# Patient Record
Sex: MALE | Race: WHITE | NOT HISPANIC OR LATINO
[De-identification: names, ages, dates, MRNs, and addresses within clinical notes are randomized per-mention and may not be internally consistent; named-entity substitution may affect disease eponyms.]

---

## 2017-04-06 ENCOUNTER — APPOINTMENT (OUTPATIENT)
Dept: CARDIOLOGY | Facility: CLINIC | Age: 77
End: 2017-04-06

## 2017-07-07 ENCOUNTER — APPOINTMENT (OUTPATIENT)
Dept: CARDIOLOGY | Facility: CLINIC | Age: 77
End: 2017-07-07

## 2017-12-19 ENCOUNTER — RECORD ABSTRACTING (OUTPATIENT)
Age: 77
End: 2017-12-19

## 2017-12-19 DIAGNOSIS — Z86.79 PERSONAL HISTORY OF OTHER DISEASES OF THE CIRCULATORY SYSTEM: ICD-10-CM

## 2017-12-19 DIAGNOSIS — Z87.19 PERSONAL HISTORY OF OTHER DISEASES OF THE DIGESTIVE SYSTEM: ICD-10-CM

## 2017-12-19 DIAGNOSIS — Z87.39 PERSONAL HISTORY OF OTHER DISEASES OF THE MUSCULOSKELETAL SYSTEM AND CONNECTIVE TISSUE: ICD-10-CM

## 2017-12-19 DIAGNOSIS — Z86.39 PERSONAL HISTORY OF OTHER ENDOCRINE, NUTRITIONAL AND METABOLIC DISEASE: ICD-10-CM

## 2017-12-19 DIAGNOSIS — Z86.73 PERSONAL HISTORY OF TRANSIENT ISCHEMIC ATTACK (TIA), AND CEREBRAL INFARCTION W/OUT RESIDUAL DEFICITS: ICD-10-CM

## 2017-12-19 DIAGNOSIS — Z87.898 PERSONAL HISTORY OF OTHER SPECIFIED CONDITIONS: ICD-10-CM

## 2017-12-19 DIAGNOSIS — Z80.0 FAMILY HISTORY OF MALIGNANT NEOPLASM OF DIGESTIVE ORGANS: ICD-10-CM

## 2017-12-19 DIAGNOSIS — Z78.9 OTHER SPECIFIED HEALTH STATUS: ICD-10-CM

## 2017-12-19 DIAGNOSIS — Z98.890 OTHER SPECIFIED POSTPROCEDURAL STATES: ICD-10-CM

## 2017-12-19 DIAGNOSIS — Z86.69 PERSONAL HISTORY OF OTHER DISEASES OF THE NERVOUS SYSTEM AND SENSE ORGANS: ICD-10-CM

## 2017-12-20 ENCOUNTER — RX RENEWAL (OUTPATIENT)
Age: 77
End: 2017-12-20

## 2018-03-09 ENCOUNTER — MEDICATION RENEWAL (OUTPATIENT)
Age: 78
End: 2018-03-09

## 2018-03-09 ENCOUNTER — APPOINTMENT (OUTPATIENT)
Dept: CARDIOLOGY | Facility: CLINIC | Age: 78
End: 2018-03-09
Payer: MEDICARE

## 2018-03-09 ENCOUNTER — APPOINTMENT (OUTPATIENT)
Dept: CARDIOLOGY | Facility: CLINIC | Age: 78
End: 2018-03-09

## 2018-03-09 VITALS
DIASTOLIC BLOOD PRESSURE: 72 MMHG | HEART RATE: 72 BPM | HEIGHT: 71 IN | WEIGHT: 210 LBS | SYSTOLIC BLOOD PRESSURE: 118 MMHG | BODY MASS INDEX: 29.4 KG/M2

## 2018-03-09 PROCEDURE — 99214 OFFICE O/P EST MOD 30 MIN: CPT

## 2018-03-12 ENCOUNTER — RX RENEWAL (OUTPATIENT)
Age: 78
End: 2018-03-12

## 2018-03-27 ENCOUNTER — RX RENEWAL (OUTPATIENT)
Age: 78
End: 2018-03-27

## 2018-06-07 ENCOUNTER — MEDICATION RENEWAL (OUTPATIENT)
Age: 78
End: 2018-06-07

## 2018-06-08 ENCOUNTER — RX RENEWAL (OUTPATIENT)
Age: 78
End: 2018-06-08

## 2018-12-19 ENCOUNTER — RX RENEWAL (OUTPATIENT)
Age: 78
End: 2018-12-19

## 2018-12-26 ENCOUNTER — RX RENEWAL (OUTPATIENT)
Age: 78
End: 2018-12-26

## 2019-04-09 ENCOUNTER — MEDICATION RENEWAL (OUTPATIENT)
Age: 79
End: 2019-04-09

## 2019-07-09 ENCOUNTER — NON-APPOINTMENT (OUTPATIENT)
Age: 79
End: 2019-07-09

## 2019-07-09 ENCOUNTER — APPOINTMENT (OUTPATIENT)
Dept: CARDIOLOGY | Facility: CLINIC | Age: 79
End: 2019-07-09
Payer: MEDICARE

## 2019-07-09 VITALS
BODY MASS INDEX: 29.68 KG/M2 | WEIGHT: 212 LBS | DIASTOLIC BLOOD PRESSURE: 56 MMHG | HEART RATE: 56 BPM | SYSTOLIC BLOOD PRESSURE: 104 MMHG | HEIGHT: 71 IN

## 2019-07-09 PROCEDURE — 93000 ELECTROCARDIOGRAM COMPLETE: CPT

## 2019-07-09 PROCEDURE — 99214 OFFICE O/P EST MOD 30 MIN: CPT | Mod: 25

## 2019-07-09 RX ORDER — FINASTERIDE 5 MG/1
5 TABLET, FILM COATED ORAL DAILY
Refills: 0 | Status: DISCONTINUED | COMMUNITY
End: 2019-07-09

## 2019-07-09 RX ORDER — METOPROLOL TARTRATE 25 MG/1
25 TABLET, FILM COATED ORAL
Qty: 180 | Refills: 3 | Status: DISCONTINUED | COMMUNITY
Start: 2018-06-08 | End: 2019-07-09

## 2019-07-09 RX ORDER — HYDROCHLOROTHIAZIDE 12.5 MG/1
12.5 CAPSULE ORAL DAILY
Refills: 0 | Status: DISCONTINUED | COMMUNITY
End: 2019-07-09

## 2019-07-09 RX ORDER — UBIDECARENONE 200 MG
200 CAPSULE ORAL DAILY
Refills: 0 | Status: DISCONTINUED | COMMUNITY
End: 2019-07-09

## 2019-07-09 NOTE — ASSESSMENT
[FreeTextEntry1] : CAD: The patient has good exercise ability without exertional symptoms.\par \par Hypertension: Well controlled.\par \par Mitral regurgitation: Asymptomatic, however it has been 5 years since last echocardiogram. Will order to monitor for progression of disease.\par \par Follow up on 1 year or sooner if needed.

## 2019-07-09 NOTE — HISTORY OF PRESENT ILLNESS
[FreeTextEntry1] : CAD: The patient has good exercise ability without exertional symptoms.\par \par Hypertension: Well controlled.\par \par Mitral regurgitation: Asymptomatic

## 2019-07-09 NOTE — REVIEW OF SYSTEMS
[Shortness Of Breath] : no shortness of breath [Dyspnea on exertion] : not dyspnea during exertion [Chest Pain] : no chest pain [Lower Ext Edema] : no extremity edema [Palpitations] : no palpitations [Joint Pain] : joint pain [Joint Stiffness] : joint stiffness [Negative] : Heme/Lymph

## 2019-07-09 NOTE — PHYSICAL EXAM
[General Appearance - Well Developed] : well developed [Normal Appearance] : normal appearance [Well Groomed] : well groomed [General Appearance - Well Nourished] : well nourished [No Deformities] : no deformities [General Appearance - In No Acute Distress] : no acute distress [Normal Conjunctiva] : the conjunctiva exhibited no abnormalities [Eyelids - No Xanthelasma] : the eyelids demonstrated no xanthelasmas [Normal Oral Mucosa] : normal oral mucosa [No Oral Pallor] : no oral pallor [No Oral Cyanosis] : no oral cyanosis [Normal Jugular Venous A Waves Present] : normal jugular venous A waves present [Normal Jugular Venous V Waves Present] : normal jugular venous V waves present [No Jugular Venous Lang A Waves] : no jugular venous lang A waves [Respiration, Rhythm And Depth] : normal respiratory rhythm and effort [Exaggerated Use Of Accessory Muscles For Inspiration] : no accessory muscle use [Auscultation Breath Sounds / Voice Sounds] : lungs were clear to auscultation bilaterally [Heart Rate And Rhythm] : heart rate and rhythm were normal [Heart Sounds] : normal S1 and S2 [Abnormal Walk] : normal gait [Murmurs] : no murmurs present [Nail Clubbing] : no clubbing of the fingernails [Cyanosis, Localized] : no localized cyanosis [Skin Turgor] : normal skin turgor [Impaired Insight] : insight and judgment were intact [] : no rash [Affect] : the affect was normal [Memory Recent] : recent memory was not impaired

## 2019-07-12 ENCOUNTER — APPOINTMENT (OUTPATIENT)
Dept: CARDIOLOGY | Facility: CLINIC | Age: 79
End: 2019-07-12
Payer: MEDICARE

## 2019-07-12 PROCEDURE — 93306 TTE W/DOPPLER COMPLETE: CPT

## 2019-07-15 ENCOUNTER — APPOINTMENT (OUTPATIENT)
Dept: CARDIOLOGY | Facility: CLINIC | Age: 79
End: 2019-07-15

## 2019-10-09 ENCOUNTER — MEDICATION RENEWAL (OUTPATIENT)
Age: 79
End: 2019-10-09

## 2020-03-26 ENCOUNTER — RX RENEWAL (OUTPATIENT)
Age: 80
End: 2020-03-26

## 2020-06-14 ENCOUNTER — RX RENEWAL (OUTPATIENT)
Age: 80
End: 2020-06-14

## 2021-03-23 ENCOUNTER — RX RENEWAL (OUTPATIENT)
Age: 81
End: 2021-03-23

## 2021-11-16 ENCOUNTER — RX RENEWAL (OUTPATIENT)
Age: 81
End: 2021-11-16

## 2021-12-07 ENCOUNTER — NON-APPOINTMENT (OUTPATIENT)
Age: 81
End: 2021-12-07

## 2021-12-08 ENCOUNTER — APPOINTMENT (OUTPATIENT)
Dept: CARDIOLOGY | Facility: CLINIC | Age: 81
End: 2021-12-08
Payer: MEDICARE

## 2021-12-08 ENCOUNTER — NON-APPOINTMENT (OUTPATIENT)
Age: 81
End: 2021-12-08

## 2021-12-08 VITALS
WEIGHT: 212 LBS | DIASTOLIC BLOOD PRESSURE: 58 MMHG | HEART RATE: 57 BPM | OXYGEN SATURATION: 98 % | TEMPERATURE: 96.9 F | HEIGHT: 71 IN | BODY MASS INDEX: 29.68 KG/M2 | SYSTOLIC BLOOD PRESSURE: 90 MMHG

## 2021-12-08 PROCEDURE — 99214 OFFICE O/P EST MOD 30 MIN: CPT

## 2021-12-08 PROCEDURE — 93000 ELECTROCARDIOGRAM COMPLETE: CPT

## 2021-12-08 RX ORDER — METOPROLOL TARTRATE 25 MG/1
25 TABLET, FILM COATED ORAL
Qty: 60 | Refills: 0 | Status: DISCONTINUED | COMMUNITY
Start: 2020-06-14 | End: 2021-12-08

## 2021-12-08 RX ORDER — METOPROLOL TARTRATE 25 MG/1
25 TABLET, FILM COATED ORAL DAILY
Refills: 0 | Status: DISCONTINUED | COMMUNITY
Start: 1900-01-01 | End: 2021-12-08

## 2021-12-09 ENCOUNTER — NON-APPOINTMENT (OUTPATIENT)
Age: 81
End: 2021-12-09

## 2021-12-10 ENCOUNTER — APPOINTMENT (OUTPATIENT)
Dept: CARDIOLOGY | Facility: CLINIC | Age: 81
End: 2021-12-10
Payer: MEDICARE

## 2021-12-10 PROCEDURE — 78452 HT MUSCLE IMAGE SPECT MULT: CPT

## 2021-12-10 PROCEDURE — A9502: CPT

## 2021-12-10 PROCEDURE — 93015 CV STRESS TEST SUPVJ I&R: CPT

## 2021-12-10 NOTE — HISTORY OF PRESENT ILLNESS
[FreeTextEntry1] : \par 82 yo M with CAD s/p PCI RCA/LAD 2010, HTN, HLD, former smoker, obesity here for cv follow up. Last visit was in 2019. requires refills.\par \par since that visit, patient has been feeling well. weight stable. mild dyspnea. \par \par Initial EKG today with possible atrial fibrillation; repeat with likely junctional bradycardia. \par \par \par TESTING:\par \par 2019 TTE preserved function. mild LAE\par LE art u/s: mild disease\par labs normal cbc/lft. cr 1.17. ldl 66 \par \par 2016 stress echo no ischemia but difficult study and 3 minutes exercise\par \par 2015 no AAA\par \par Cath 2014 patent stents. mid LAD 30%.\par \par Cath 2010 PCI RCA 3.0x28 [employer] and mid LAD 3.0x15 BMS

## 2021-12-10 NOTE — DISCUSSION/SUMMARY
[FreeTextEntry1] : \par 82 yo M with CAD s/p PCI RCA/LAD 2010, HTN, HLD, former smoker, obesity here for cv follow up. Last visit was in 2019. requires refills. since that visit, patient has been feeling well. weight stable. mild dyspnea. \par \par Initial EKG today with possible atrial fibrillation; repeat with likely junctional bradycardia. \par \par Recommend: \par aspirin, high potency statin, stop any AVN blocker\par I ordered a:\par 3 day zio patch\par comprehensive labwork\par updated TTE\par pharmacologic NST\par carotid duplex\par home sleep study\par \par \par Follow up after testing. Patient declines testing. Will call back if any changes. ER precautions given to patient.

## 2021-12-10 NOTE — PHYSICAL EXAM
[General Appearance - Well Developed] : well developed [Normal Appearance] : normal appearance [Well Groomed] : well groomed [General Appearance - Well Nourished] : well nourished [No Deformities] : no deformities [General Appearance - In No Acute Distress] : no acute distress [Normal Conjunctiva] : the conjunctiva exhibited no abnormalities [Eyelids - No Xanthelasma] : the eyelids demonstrated no xanthelasmas [Normal Oral Mucosa] : normal oral mucosa [No Oral Pallor] : no oral pallor [No Oral Cyanosis] : no oral cyanosis [Normal Jugular Venous V Waves Present] : normal jugular venous V waves present [No Jugular Venous Lang A Waves] : no jugular venous lang A waves [Respiration, Rhythm And Depth] : normal respiratory rhythm and effort [Nail Clubbing] : no clubbing of the fingernails [Cyanosis, Localized] : no localized cyanosis [Skin Turgor] : normal skin turgor [] : no rash [Impaired Insight] : insight and judgment were intact [Affect] : the affect was normal [Memory Recent] : recent memory was not impaired [FreeTextEntry1] : cane

## 2021-12-13 DIAGNOSIS — Z87.898 PERSONAL HISTORY OF OTHER SPECIFIED CONDITIONS: ICD-10-CM

## 2021-12-13 DIAGNOSIS — I34.0 NONRHEUMATIC MITRAL (VALVE) INSUFFICIENCY: ICD-10-CM

## 2021-12-14 ENCOUNTER — NON-APPOINTMENT (OUTPATIENT)
Age: 81
End: 2021-12-14

## 2021-12-15 ENCOUNTER — NON-APPOINTMENT (OUTPATIENT)
Age: 81
End: 2021-12-15

## 2021-12-16 ENCOUNTER — APPOINTMENT (OUTPATIENT)
Dept: CARDIOLOGY | Facility: CLINIC | Age: 81
End: 2021-12-16
Payer: MEDICARE

## 2021-12-16 PROCEDURE — 93242 EXT ECG>48HR<7D RECORDING: CPT

## 2021-12-16 PROCEDURE — 93306 TTE W/DOPPLER COMPLETE: CPT

## 2021-12-16 PROCEDURE — 93880 EXTRACRANIAL BILAT STUDY: CPT

## 2021-12-17 ENCOUNTER — NON-APPOINTMENT (OUTPATIENT)
Age: 81
End: 2021-12-17

## 2021-12-20 ENCOUNTER — APPOINTMENT (OUTPATIENT)
Dept: CARDIOLOGY | Facility: CLINIC | Age: 81
End: 2021-12-20
Payer: MEDICARE

## 2021-12-20 VITALS
HEIGHT: 71 IN | SYSTOLIC BLOOD PRESSURE: 112 MMHG | WEIGHT: 209 LBS | TEMPERATURE: 97.1 F | DIASTOLIC BLOOD PRESSURE: 54 MMHG | OXYGEN SATURATION: 95 % | HEART RATE: 61 BPM | BODY MASS INDEX: 29.26 KG/M2

## 2021-12-20 PROCEDURE — 99215 OFFICE O/P EST HI 40 MIN: CPT

## 2021-12-20 RX ORDER — ASPIRIN ENTERIC COATED TABLETS 81 MG 81 MG/1
81 TABLET, DELAYED RELEASE ORAL DAILY
Refills: 0 | Status: DISCONTINUED | COMMUNITY
End: 2021-12-20

## 2021-12-20 NOTE — DISCUSSION/SUMMARY
[FreeTextEntry1] : \par 82 yo M with CAD s/p PCI RCA/LAD 2010, newly diagnosed paroxysmal atrial fibrillation (CHADSVASC 5), HTN, HLD, NIDDM2, PAD (severe left ICA stenosis), former smoker, obesity, mod AI here for cv follow up.\par \par \par - zio patch results and call patient. off AVN for now. If persistent AF, will call patient to discuss potential ASHLI/DCCV in 4 weeks.\par - switch aspirin to eliquis 5 mg BID after lengthy discussion\par - increase atorvastatin to 80 (goal LDL<70)\par - Check carotid duplex in 6 months. If velocities, especially diastolic increase, consider intervention. \par - yearly echo for AI\par - he will call back if he is amenable to home sleep study\par - newly diagnosed DM2 treatment per PCP, consideration of metformin\par \par \par Forward to PCP, Dr. Lee. Follow up in 6 months with same day carotid duplex. ER precautions given to patient.

## 2021-12-24 ENCOUNTER — NON-APPOINTMENT (OUTPATIENT)
Age: 81
End: 2021-12-24

## 2021-12-27 ENCOUNTER — NON-APPOINTMENT (OUTPATIENT)
Age: 81
End: 2021-12-27

## 2021-12-27 ENCOUNTER — RX RENEWAL (OUTPATIENT)
Age: 81
End: 2021-12-27

## 2021-12-27 ENCOUNTER — APPOINTMENT (OUTPATIENT)
Dept: CARDIOLOGY | Facility: CLINIC | Age: 81
End: 2021-12-27
Payer: MEDICARE

## 2021-12-27 VITALS
BODY MASS INDEX: 28.7 KG/M2 | HEIGHT: 71 IN | TEMPERATURE: 97.3 F | WEIGHT: 205 LBS | DIASTOLIC BLOOD PRESSURE: 70 MMHG | HEART RATE: 63 BPM | SYSTOLIC BLOOD PRESSURE: 138 MMHG | OXYGEN SATURATION: 94 %

## 2021-12-27 PROCEDURE — 99215 OFFICE O/P EST HI 40 MIN: CPT

## 2021-12-27 PROCEDURE — 93244 EXT ECG>48HR<7D REV&INTERPJ: CPT

## 2021-12-27 RX ORDER — ASPIRIN 81 MG
81 TABLET, DELAYED RELEASE (ENTERIC COATED) ORAL
Refills: 0 | Status: DISCONTINUED | COMMUNITY
End: 2021-12-27

## 2021-12-27 RX ORDER — ALLOPURINOL 100 MG/1
100 TABLET ORAL DAILY
Refills: 0 | Status: ACTIVE | COMMUNITY

## 2021-12-27 RX ORDER — APIXABAN 5 MG/1
5 TABLET, FILM COATED ORAL
Qty: 90 | Refills: 3 | Status: DISCONTINUED | COMMUNITY
Start: 2021-12-20 | End: 2021-12-27

## 2021-12-27 NOTE — DISCUSSION/SUMMARY
[FreeTextEntry1] : \par 82 yo M with CAD s/p PCI RCA/LAD 2010, newly diagnosed paroxysmal atrial fibrillation (CHADSVASC 5), HTN, HLD, NIDDM2, PAD (severe left ICA stenosis), former smoker, obesity, mod AI here for add-on appointment due to abnormal Zio patch\par \par 3-day Zio patch: Read as sinus and SVT however potentially appears to be atrial fibrillation.  5.3-second pause reported to be in sinus rhythm however I cannot rule out that this was in atrial fibrillation. This patch was ON the metoprolol.\par \par \par - will expedite review by EP (patch was on BB). off AVN for now.\par - switched aspirin to eliquis 5 mg BID after lengthy discussion (but due to cost, after these 45 days will switch; seeing cost of xarelto and pradaxa).\par - increased atorvastatin to 80 (goal LDL<70)\par - Check carotid duplex in 6 months next 6/2022. If velocities, especially diastolic increase, consider intervention. \par - yearly echo for AI next 12/2022\par - he will call back if he is amenable to home sleep study\par - newly diagnosed DM2 treatment per PCP, consideration of metformin\par \par \par Call him with plan from EP standpoint tomorrow. Follow up with me in 6 months with same day carotid duplex (call and adapt plan prior). ER precautions given to patient.

## 2021-12-27 NOTE — HISTORY OF PRESENT ILLNESS
[FreeTextEntry1] : \par 80 yo M with CAD s/p PCI RCA/LAD 2010, newly diagnosed paroxysmal atrial fibrillation (CHADSVASC 5), HTN, HLD, NIDDM2, PAD (severe left ICA stenosis), former smoker, obesity, mod AI here for add-on appointment due to abnormal Zio patch.\par \par He declines sleep study. no active cv sx. 3-day Zio patch: Read as sinus and SVT however potentially appears to be atrial fibrillation.  5.3-second pause reported to be in sinus rhythm however I cannot rule out that this was in atrial fibrillation. This patch was ON the metoprolol.\par \par \par \par TESTING:\par 12/2021:\par 3-day Zio patch: Read as sinus and SVT however potentially appears to be atrial fibrillation.  5.3-second pause reported to be in sinus rhythm however I cannot rule out that this was in atrial fibrillation. This patch was ON the metoprolol.\par Nuclear stress test: No ischemia.  Preserved function.  Atrial fibrillation noted on EKG tracings.\par TTE: Mild mitral valve disease.  Moderate aortic regurgitation.  Mildly dilated left atrium.  Preserved biventricular function. G1DD.  Normal PASP.\par Carotid duplex: Left 70-99%. mild JOSUE\par Labwork: Grossly normal CBC, CMP.  Creatinine 1.21.  Normal TFT.  LDL 78.  A1c 7.0.  \par \par 2019 TTE preserved function. mild LAE\par LE art u/s: mild disease\par labs normal cbc/lft. cr 1.17. ldl 66 \par \par 2016 stress echo no ischemia but difficult study and 3 minutes exercise\par \par 2015 no AAA\par \par Cath 2014 patent stents. mid LAD 30%.\par \par Cath 2010 PCI RCA 3.0x28 [employer] and mid LAD 3.0x15 BMS

## 2021-12-30 ENCOUNTER — NON-APPOINTMENT (OUTPATIENT)
Age: 81
End: 2021-12-30

## 2022-01-03 ENCOUNTER — APPOINTMENT (OUTPATIENT)
Dept: ELECTROPHYSIOLOGY | Facility: CLINIC | Age: 82
End: 2022-01-03
Payer: MEDICARE

## 2022-01-03 VITALS
DIASTOLIC BLOOD PRESSURE: 60 MMHG | WEIGHT: 205 LBS | HEIGHT: 71 IN | BODY MASS INDEX: 28.7 KG/M2 | HEART RATE: 84 BPM | OXYGEN SATURATION: 98 % | SYSTOLIC BLOOD PRESSURE: 134 MMHG | TEMPERATURE: 97.1 F

## 2022-01-03 PROCEDURE — 93000 ELECTROCARDIOGRAM COMPLETE: CPT

## 2022-01-03 PROCEDURE — 99204 OFFICE O/P NEW MOD 45 MIN: CPT

## 2022-01-23 ENCOUNTER — NON-APPOINTMENT (OUTPATIENT)
Age: 82
End: 2022-01-23

## 2022-01-23 NOTE — PHYSICAL EXAM
[No Acute Distress] : no acute distress [Normal Conjunctiva] : normal conjunctiva [Normal Venous Pressure] : normal venous pressure [Normal S1, S2] : normal S1, S2 [Clear Lung Fields] : clear lung fields [Non Tender] : non-tender [No Edema] : no edema [No Cyanosis] : no cyanosis [No Rash] : no rash [Moves all extremities] : moves all extremities [Alert and Oriented] : alert and oriented

## 2022-01-23 NOTE — DISCUSSION/SUMMARY
[FreeTextEntry1] : This is an 81-year-old man who has had prior paroxysmal atrial fibrillation and is anticoagulated with Xarelto.  He had a 5.3-second pause while sleeping.  Patient was on beta-blocker therapy as well.  He denies any symptoms of dizziness, lightheadedness, syncope or presyncope.  Beta-blocker has been discontinued and follow-up monitoring recommended to see if he has recurrent pauses.  Patient was also instructed regarding symptoms related to bradycardia and the need for emergent treatment if symptoms should develop.  He does not want pacing this time.  We have discussed the option also of implantable loop monitor.  We will start a monitor currently.  We will maintain off beta-blockers for now.  Anticoagulation.

## 2022-01-23 NOTE — HISTORY OF PRESENT ILLNESS
[FreeTextEntry1] : Patient was seen for a 5-second pause noted on Zio patch monitor.  He was previously on a beta-blocker which was discontinued.\мария childress Is an 81-year-old man with a prior history of PCI to the RCA/LAD 2010.  Prior history of hypertension, diabetes, dyslipidemia, obesity vascular disease with severe left internal carotid artery stenosis.  Former smoker.\par \par He denies any symptoms of dizziness, lightheadedness, chest pain, shortness of breath, syncope or presyncope.\par \par Zio patch monitor from 12/16/2021: 3 days monitor showed 5-second pause  sinus node with junctional escape 12/16/2021 at 7:35 PM.  He has had regular rhythm suggestive of atrial fibrillation/flutter.\par \par Echocardiogram from 12/16/2021 showed EF 60%, LA 5.6 cm mild mitral regurgitation, left atrial volume index 38 cc/m², normal pulmonary pressures.\par Nuclear stress test 12/10/2021 normal myocardial perfusion scan.\мария childress Is currently on amlodipine as well as Xarelto.

## 2022-01-23 NOTE — REVIEW OF SYSTEMS
[Fever] : no fever [Feeling Fatigued] : not feeling fatigued [Blurry Vision] : no blurred vision [Sore Throat] : no sore throat [SOB] : no shortness of breath [Dyspnea on exertion] : not dyspnea during exertion [Palpitations] : no palpitations [Cough] : no cough [Abdominal Pain] : no abdominal pain [Dizziness] : no dizziness [Confusion] : no confusion was observed [Anxiety] : no anxiety [Under Stress] : not under stress [Easy Bleeding] : no tendency for easy bleeding

## 2022-04-04 ENCOUNTER — APPOINTMENT (OUTPATIENT)
Dept: CARDIOLOGY | Facility: CLINIC | Age: 82
End: 2022-04-04
Payer: MEDICARE

## 2022-04-04 ENCOUNTER — NON-APPOINTMENT (OUTPATIENT)
Age: 82
End: 2022-04-04

## 2022-04-04 VITALS
WEIGHT: 200 LBS | BODY MASS INDEX: 28 KG/M2 | TEMPERATURE: 97.1 F | DIASTOLIC BLOOD PRESSURE: 70 MMHG | HEART RATE: 99 BPM | OXYGEN SATURATION: 98 % | HEIGHT: 71 IN | SYSTOLIC BLOOD PRESSURE: 116 MMHG

## 2022-04-04 PROCEDURE — 99215 OFFICE O/P EST HI 40 MIN: CPT

## 2022-04-04 PROCEDURE — 93880 EXTRACRANIAL BILAT STUDY: CPT

## 2022-04-04 RX ORDER — ATORVASTATIN CALCIUM 80 MG/1
80 TABLET, FILM COATED ORAL DAILY
Qty: 90 | Refills: 3 | Status: DISCONTINUED | COMMUNITY
Start: 2018-12-26 | End: 2022-04-04

## 2022-04-04 NOTE — DISCUSSION/SUMMARY
[FreeTextEntry1] : \par 81 yo M with CAD s/p PCI RCA/LAD 2010, paroxysmal atrial fibrillation (CHADSVASC 5), HTN, HLD, NIDDM2, PAD (severe left ICA stenosis), former smoker, obesity, mod AI.\par \par \par - ordered 3 day zio patch now off beta blocker. following with EP.\par - continue on xarelto 20. high risk med use. no side effects noted. serial labwork. (was on eliquis but due to cost switched). \par - continue atorva 80 and ordered serial labwork (goal LDL<70). \par - Check carotid duplex in 6 months next 10/2022. If diastolic velocity increase or symptoms, consider intervention. high risk disease process.\par - yearly echo for AI ordered\par - he will call back if he is amenable to home sleep study\par - newly diagnosed DM2 treatment per PCP, consideration of metformin\par \par ER precautions given to patient.

## 2022-04-04 NOTE — HISTORY OF PRESENT ILLNESS
[FreeTextEntry1] : \par 83 yo M with CAD s/p PCI RCA/LAD 2010, paroxysmal atrial fibrillation (CHADSVASC 5), HTN, HLD, NIDDM2, PAD (severe left ICA stenosis), former smoker, obesity, mod AI.\par \par VISIT 4/2022: interim saw EP for 5.3 sec pause while sleeping on BB. d/c'ed BB and follow up monitor recommended. no symptoms. was offered ILR but start with a monitor off BB. carotid duplex today with  and peak EDV 63. prior was EDV 70. no amaurosis fugax or cva sx. no alarm s/s.\par \par \par TESTING:\par 4/2022:\par CAROTID DUPLEX: L ICA 70-99% (peak DV 63, ). \par \par 12/2021:\par 3-day Zio patch: Read as sinus and SVT however potentially appears to be atrial fibrillation.  5.3-second pause reported to be in sinus rhythm however I cannot rule out that this was in atrial fibrillation. This patch was ON the metoprolol.\par Nuclear stress test: No ischemia.  Preserved function.  Atrial fibrillation noted on EKG tracings.\par TTE: Mild mitral valve disease.  Moderate aortic regurgitation.  Mildly dilated left atrium.  Preserved biventricular function. G1DD.  Normal PASP.\par Carotid duplex: Left 70-99%. mild JOSUE. L peak  and EDV 70.\par Labwork: Grossly normal CBC, CMP.  Creatinine 1.21.  Normal TFT.  LDL 78.  A1c 7.0.  \par \par 2019 TTE preserved function. mild LAE\par LE art u/s: mild disease\par labs normal cbc/lft. cr 1.17. ldl 66 \par \par 2016 stress echo no ischemia but difficult study and 3 minutes exercise\par \par 2015 no AAA\par \par Cath 2014 patent stents. mid LAD 30%.\par \par Cath 2010 PCI RCA 3.0x28 [employer] and mid LAD 3.0x15 BMS

## 2022-04-13 ENCOUNTER — NON-APPOINTMENT (OUTPATIENT)
Age: 82
End: 2022-04-13

## 2022-04-14 ENCOUNTER — NON-APPOINTMENT (OUTPATIENT)
Age: 82
End: 2022-04-14

## 2022-10-03 ENCOUNTER — APPOINTMENT (OUTPATIENT)
Dept: CARDIOLOGY | Facility: CLINIC | Age: 82
End: 2022-10-03

## 2022-10-03 VITALS
OXYGEN SATURATION: 98 % | HEIGHT: 71 IN | TEMPERATURE: 96.9 F | DIASTOLIC BLOOD PRESSURE: 56 MMHG | SYSTOLIC BLOOD PRESSURE: 112 MMHG | WEIGHT: 176 LBS | BODY MASS INDEX: 24.64 KG/M2 | HEART RATE: 58 BPM

## 2022-10-03 PROCEDURE — 93306 TTE W/DOPPLER COMPLETE: CPT

## 2022-10-03 PROCEDURE — 93880 EXTRACRANIAL BILAT STUDY: CPT

## 2022-10-03 PROCEDURE — 99215 OFFICE O/P EST HI 40 MIN: CPT

## 2022-10-03 RX ORDER — RIVAROXABAN 20 MG/1
20 TABLET, FILM COATED ORAL
Qty: 90 | Refills: 3 | Status: DISCONTINUED | COMMUNITY
Start: 2021-12-27 | End: 2022-10-03

## 2022-10-03 NOTE — HISTORY OF PRESENT ILLNESS
[FreeTextEntry1] : \par 83 yo M with CAD s/p PCI RCA/LAD 2010, paroxysmal atrial fibrillation (CHADSVASC 5), HTN, HLD, NIDDM2, PAD (severe left ICA stenosis), former smoker, obesity, mod AI.\par \par 10/2022: interim testing done. did well off BB. psv 307 edv 50 stable. \par \par VISIT 4/2022: interim saw EP for 5.3 sec pause while sleeping on BB. d/c'ed BB and follow up monitor recommended. no symptoms. was offered ILR but start with a monitor off BB. carotid duplex today with  and peak EDV 63. prior was EDV 70. no amaurosis fugax or cva sx. no alarm s/s.\par \par \par TESTING:\par 10/2022:\par TTE: asc ao 4.2. preserved bivent function. mod AI. \par CAROTID DUPLEX. L ICA severe, peak DV 50, .\par \par 4/2022:\par CAROTID DUPLEX: L ICA 70-99% (peak DV 63, ). \par LABWORK: hgb 13. cr 1. LDL 75. a1c 7.2. normal tft. \par 3 DAY ZIO PATCH OFF BB: brief AT, 5% PACs. no sig abnormality otherwise. \par \par 12/2021:\par 3-day Zio patch: Read as sinus and SVT however potentially appears to be atrial fibrillation.  5.3-second pause reported to be in sinus rhythm however I cannot rule out that this was in atrial fibrillation. This patch was ON the metoprolol.\par Nuclear stress test: No ischemia.  Preserved function.  Atrial fibrillation noted on EKG tracings.\par TTE: Mild mitral valve disease.  Moderate aortic regurgitation.  Mildly dilated left atrium.  Preserved biventricular function. G1DD.  Normal PASP.\par Carotid duplex: Left 70-99%. mild JOSUE. L peak  and EDV 70.\par Labwork: Grossly normal CBC, CMP.  Creatinine 1.21.  Normal TFT.  LDL 78.  A1c 7.0.  \par \par 2019 TTE preserved function. mild LAE\par LE art u/s: mild disease\par labs normal cbc/lft. cr 1.17. ldl 66 \par \par 2016 stress echo no ischemia but difficult study and 3 minutes exercise\par \par 2015 no AAA\par \par Cath 2014 patent stents. mid LAD 30%.\par \par Cath 2010 PCI RCA 3.0x28 [employer] and mid LAD 3.0x15 BMS\par

## 2022-10-03 NOTE — DISCUSSION/SUMMARY
[FreeTextEntry1] : \par 81 yo M with CAD s/p PCI RCA/LAD 2010, paroxysmal atrial fibrillation (CHADSVASC 5), HTN, HLD, NIDDM2, PAD (severe left ICA stenosis), former smoker, obesity, mod AI.\par \par did well off BB. psv 307 edv 50 stable. mod AI stable. \par \par - switch to pradaxa 150 BID. high risk med use. no side effects noted. serial labwork. (was on eliquis but due to cost switched and switched xarelto due to atypical effect). \par - continue atorva 40 and serial labwork (goal LDL<70). \par - Check carotid duplex in 6 months next 4/2023. If diastolic velocity increase or symptoms, consider intervention. high risk disease process.\par - yearly echo for AI, next 10/2023\par - he will call back if he is amenable to home sleep study\par - newly diagnosed DM2 treatment per PCP\par - PSA\par \par ****Patient has no cardiac contraindication for MRI. ER precautions given to patient. \par

## 2022-10-05 ENCOUNTER — NON-APPOINTMENT (OUTPATIENT)
Age: 82
End: 2022-10-05

## 2022-10-07 ENCOUNTER — RX CHANGE (OUTPATIENT)
Age: 82
End: 2022-10-07

## 2022-10-07 RX ORDER — DABIGATRAN ETEXILATE 150 MG/1
150 CAPSULE ORAL TWICE DAILY
Qty: 180 | Refills: 2 | Status: DISCONTINUED | COMMUNITY
Start: 2022-10-03 | End: 2022-10-07

## 2022-10-09 ENCOUNTER — NON-APPOINTMENT (OUTPATIENT)
Age: 82
End: 2022-10-09

## 2022-10-10 ENCOUNTER — NON-APPOINTMENT (OUTPATIENT)
Age: 82
End: 2022-10-10

## 2023-01-11 ENCOUNTER — APPOINTMENT (OUTPATIENT)
Dept: CARDIOLOGY | Facility: CLINIC | Age: 83
End: 2023-01-11
Payer: MEDICARE

## 2023-01-11 ENCOUNTER — NON-APPOINTMENT (OUTPATIENT)
Age: 83
End: 2023-01-11

## 2023-01-11 VITALS
HEART RATE: 63 BPM | HEIGHT: 71 IN | SYSTOLIC BLOOD PRESSURE: 96 MMHG | OXYGEN SATURATION: 97 % | DIASTOLIC BLOOD PRESSURE: 68 MMHG | BODY MASS INDEX: 24.08 KG/M2 | WEIGHT: 172 LBS

## 2023-01-11 PROCEDURE — 99214 OFFICE O/P EST MOD 30 MIN: CPT

## 2023-01-11 RX ORDER — DABIGATRAN ETEXILATE 150 MG/1
150 CAPSULE ORAL
Qty: 180 | Refills: 3 | Status: DISCONTINUED | COMMUNITY
Start: 2022-10-10 | End: 2023-01-11

## 2023-01-11 NOTE — PHYSICAL EXAM
[General Appearance - Well Developed] : well developed [Normal Appearance] : normal appearance [Well Groomed] : well groomed [General Appearance - Well Nourished] : well nourished [No Deformities] : no deformities [General Appearance - In No Acute Distress] : no acute distress [Normal Conjunctiva] : the conjunctiva exhibited no abnormalities [Eyelids - No Xanthelasma] : the eyelids demonstrated no xanthelasmas [Normal Oral Mucosa] : normal oral mucosa [No Oral Pallor] : no oral pallor [No Oral Cyanosis] : no oral cyanosis [Normal Jugular Venous V Waves Present] : normal jugular venous V waves present [No Jugular Venous Lang A Waves] : no jugular venous lang A waves [Respiration, Rhythm And Depth] : normal respiratory rhythm and effort [FreeTextEntry1] : cane [Nail Clubbing] : no clubbing of the fingernails [Cyanosis, Localized] : no localized cyanosis [Skin Turgor] : normal skin turgor [] : no rash [Impaired Insight] : insight and judgment were intact [Affect] : the affect was normal [Memory Recent] : recent memory was not impaired

## 2023-01-11 NOTE — DISCUSSION/SUMMARY
[FreeTextEntry1] : 81 yo M with CAD s/p PCI RCA/LAD 2010, paroxysmal atrial fibrillation (CHADSVASC 5), HTN, HLD, NIDDM2, PAD (severe left ICA stenosis), former smoker, obesity, mod AI.\par \par \par PAF on Pradaxa. Patient having difficulty with cost and requesting to switch back to Eliquis. Rx sent to new pharmacy. High risk medication with no evidence of toxicity at present. Routine monitoring is required. Reviewed risks, benefits, and adverse effects of medication. If there is any change in clinical status our office should be notified immediately. Patient verbalizes understanding. \par Continue to follow labs\par As per last note atypical side effect to Xarelto\par  \par HLD recommend tp continue atorva 40 and serial labwork (goal LDL<70). \par \par Carotid duplex as per last note reassess in 4/2023. If diastolic velocity increase or symptoms, consider intervention. high risk disease process.\par \par AI, as per last note check next 10/2023\par \par DM followed by PCP\par \par Red flag symptoms which would warrant sooner emergent evaluation reviewed with the patient. \par Questions and concerns were addressed and answered. \par Limitations of non-invasive testing reviewed\par \par Sincerely,\par \par Jenny Moses PA-C\par Patients history, testing and plan reviewed with supervising MD: Dr. Gisselle Mckee \par

## 2023-01-11 NOTE — HISTORY OF PRESENT ILLNESS
[FreeTextEntry1] : \par 83 yo M with CAD s/p PCI RCA/LAD 2010, paroxysmal atrial fibrillation (CHADSVASC 5), HTN, HLD, NIDDM2, PAD (severe left ICA stenosis), former smoker, obesity, mod AI.\par Patient presents to the office today to discuss changing Pradaxa back to Eliquis due to financial reasons. Recently switched pharmacy and they feel this will be a better option for him. \par Clinically has been doing well. \par Today he denies chest pain, pressure, unusual shortness of breath, lightheadedness, dizziness, near syncope or syncope. \par Previous evaluation with EP for 5.3 sec pause while sleeping on BB. d/c'ed BB and follow up monitor recommended. no symptoms. was offered ILR but start with a monitor off BB. \par \par \par TESTING:\par 10/2022:\par TTE: asc ao 4.2. preserved bivent function. mod AI. \par CAROTID DUPLEX. L ICA severe, peak DV 50, .\par \par 4/2022:\par CAROTID DUPLEX: L ICA 70-99% (peak DV 63, ). \par LABWORK: hgb 13. cr 1. LDL 75. a1c 7.2. normal tft. \par 3 DAY ZIO PATCH OFF BB: brief AT, 5% PACs. no sig abnormality otherwise. \par \par 12/2021:\par 3-day Zio patch: Read as sinus and SVT however potentially appears to be atrial fibrillation.  5.3-second pause reported to be in sinus rhythm however I cannot rule out that this was in atrial fibrillation. This patch was ON the metoprolol.\par Nuclear stress test: No ischemia.  Preserved function.  Atrial fibrillation noted on EKG tracings.\par TTE: Mild mitral valve disease.  Moderate aortic regurgitation.  Mildly dilated left atrium.  Preserved biventricular function. G1DD.  Normal PASP.\par Carotid duplex: Left 70-99%. mild JOSUE. L peak  and EDV 70.\par Labwork: Grossly normal CBC, CMP.  Creatinine 1.21.  Normal TFT.  LDL 78.  A1c 7.0.  \par \par 2019 TTE preserved function. mild LAE\par LE art u/s: mild disease\par labs normal cbc/lft. cr 1.17. ldl 66 \par \par 2016 stress echo no ischemia but difficult study and 3 minutes exercise\par \par 2015 no AAA\par \par Cath 2014 patent stents. mid LAD 30%.\par \par Cath 2010 PCI RCA 3.0x28 [employer] and mid LAD 3.0x15 BMS\par

## 2023-03-29 ENCOUNTER — NON-APPOINTMENT (OUTPATIENT)
Age: 83
End: 2023-03-29

## 2023-04-12 ENCOUNTER — APPOINTMENT (OUTPATIENT)
Dept: CARDIOLOGY | Facility: CLINIC | Age: 83
End: 2023-04-12
Payer: MEDICARE

## 2023-04-12 ENCOUNTER — NON-APPOINTMENT (OUTPATIENT)
Age: 83
End: 2023-04-12

## 2023-04-12 VITALS
WEIGHT: 170 LBS | SYSTOLIC BLOOD PRESSURE: 102 MMHG | OXYGEN SATURATION: 96 % | DIASTOLIC BLOOD PRESSURE: 56 MMHG | HEART RATE: 83 BPM | BODY MASS INDEX: 23.8 KG/M2 | HEIGHT: 71 IN

## 2023-04-12 PROCEDURE — 93880 EXTRACRANIAL BILAT STUDY: CPT

## 2023-04-12 PROCEDURE — 99215 OFFICE O/P EST HI 40 MIN: CPT

## 2023-04-12 NOTE — DISCUSSION/SUMMARY
[FreeTextEntry1] : \par 82 yo M with CAD s/p PCI RCA/LAD 2010, paroxysmal atrial fibrillation (CHADSVASC 5), HTN, HLD, NIDDM2, PAD (severe left ICA stenosis), former smoker, obesity, mod AI.\par \par did well off BB. psv 307 edv 68 stable. mod AI stable. \par \par - eliquis monotherapy. high risk med use. no side effects noted. serial labwork\par - continue atorva 40 and serial labwork (goal LDL<70). \par - Check carotid duplex in 6 months next 10/2023. If diastolic velocity increase > 100 or symptoms, consider intervention. high risk disease process.\par - yearly echo for AI, next 10/2023\par - he will call back if he is amenable to home sleep study\par - newly diagnosed DM2 treatment per PCP\par - PSA elevated but decreased, PLEASE NOTE THAT ****Patient has no cardiac contraindication for MRI. ER precautions given to patient. I will contact urology directly. \par

## 2023-04-12 NOTE — HISTORY OF PRESENT ILLNESS
[FreeTextEntry1] : \par 84 yo M with CAD s/p PCI RCA/LAD 2010, paroxysmal atrial fibrillation (CHADSVASC 5), HTN, HLD, NIDDM2, PAD (severe left ICA stenosis), former smoker, obesity, mod AI.\par \par 4/2023: no symptoms from carotid disease. no angina or dyspnea. \par \par 10/2022: interim testing done. did well off BB. psv 307 edv 50 stable. \par \par VISIT 4/2022: interim saw EP for 5.3 sec pause while sleeping on BB. d/c'ed BB and follow up monitor recommended. no symptoms. was offered ILR but start with a monitor off BB. carotid duplex today with  and peak EDV 63. prior was EDV 70. no amaurosis fugax or cva sx. no alarm s/s.\par \par \par TESTING:\par 4/2023 carotid duplex: L ICA severe EDV 68 now. PSV stable. \par 2/2023 LABWORK: psa elevated. cbc/cmp/a1c/lipid \par \par 10/2022:\par TTE: asc ao 4.2. preserved bivent function. mod AI. \par CAROTID DUPLEX. L ICA severe, peak DV 50, .\par \par 4/2022:\par CAROTID DUPLEX: L ICA 70-99% (peak DV 63, ). \par LABWORK: hgb 13. cr 1. LDL 75. a1c 7.2. normal tft. \par 3 DAY ZIO PATCH OFF BB: brief AT, 5% PACs. no sig abnormality otherwise. \par \par 12/2021:\par 3-day Zio patch: Read as sinus and SVT however potentially appears to be atrial fibrillation.  5.3-second pause reported to be in sinus rhythm however I cannot rule out that this was in atrial fibrillation. This patch was ON the metoprolol.\par Nuclear stress test: No ischemia.  Preserved function.  Atrial fibrillation noted on EKG tracings.\par TTE: Mild mitral valve disease.  Moderate aortic regurgitation.  Mildly dilated left atrium.  Preserved biventricular function. G1DD.  Normal PASP.\par Carotid duplex: Left 70-99%. mild JOSUE. L peak  and EDV 70.\par Labwork: Grossly normal CBC, CMP.  Creatinine 1.21.  Normal TFT.  LDL 78.  A1c 7.0.  \par \par 2019 TTE preserved function. mild LAE\par LE art u/s: mild disease\par labs normal cbc/lft. cr 1.17. ldl 66 \par \par 2016 stress echo no ischemia but difficult study and 3 minutes exercise\par \par 2015 no AAA\par \par Cath 2014 patent stents. mid LAD 30%.\par \par Cath 2010 PCI RCA 3.0x28 [employer] and mid LAD 3.0x15 BMS\par

## 2023-04-12 NOTE — PHYSICAL EXAM
[General Appearance - Well Developed] : well developed [Normal Appearance] : normal appearance [Well Groomed] : well groomed [General Appearance - Well Nourished] : well nourished [No Deformities] : no deformities [General Appearance - In No Acute Distress] : no acute distress [Normal Conjunctiva] : the conjunctiva exhibited no abnormalities [Eyelids - No Xanthelasma] : the eyelids demonstrated no xanthelasmas [Normal Oral Mucosa] : normal oral mucosa [No Oral Pallor] : no oral pallor [No Oral Cyanosis] : no oral cyanosis [Normal Jugular Venous V Waves Present] : normal jugular venous V waves present [No Jugular Venous Lang A Waves] : no jugular venous lagn A waves [Respiration, Rhythm And Depth] : normal respiratory rhythm and effort [Nail Clubbing] : no clubbing of the fingernails [Cyanosis, Localized] : no localized cyanosis [Skin Turgor] : normal skin turgor [] : no rash [Impaired Insight] : insight and judgment were intact [Affect] : the affect was normal [Memory Recent] : recent memory was not impaired [FreeTextEntry1] : cane

## 2023-10-03 ENCOUNTER — NON-APPOINTMENT (OUTPATIENT)
Age: 83
End: 2023-10-03

## 2023-10-04 ENCOUNTER — APPOINTMENT (OUTPATIENT)
Dept: CARDIOLOGY | Facility: CLINIC | Age: 83
End: 2023-10-04
Payer: MEDICARE

## 2023-10-04 PROCEDURE — 93306 TTE W/DOPPLER COMPLETE: CPT

## 2023-10-04 PROCEDURE — 93880 EXTRACRANIAL BILAT STUDY: CPT

## 2023-10-05 ENCOUNTER — NON-APPOINTMENT (OUTPATIENT)
Age: 83
End: 2023-10-05

## 2023-10-07 ENCOUNTER — NON-APPOINTMENT (OUTPATIENT)
Age: 83
End: 2023-10-07

## 2023-10-11 ENCOUNTER — APPOINTMENT (OUTPATIENT)
Dept: CARDIOLOGY | Facility: CLINIC | Age: 83
End: 2023-10-11
Payer: MEDICARE

## 2023-10-11 VITALS
WEIGHT: 160 LBS | HEIGHT: 71 IN | HEART RATE: 97 BPM | BODY MASS INDEX: 22.4 KG/M2 | DIASTOLIC BLOOD PRESSURE: 56 MMHG | SYSTOLIC BLOOD PRESSURE: 122 MMHG | OXYGEN SATURATION: 99 %

## 2023-10-11 DIAGNOSIS — E66.9 OBESITY, UNSPECIFIED: ICD-10-CM

## 2023-10-11 DIAGNOSIS — E11.9 TYPE 2 DIABETES MELLITUS W/OUT COMPLICATIONS: ICD-10-CM

## 2023-10-11 DIAGNOSIS — Z87.891 PERSONAL HISTORY OF NICOTINE DEPENDENCE: ICD-10-CM

## 2023-10-11 DIAGNOSIS — R06.09 OTHER FORMS OF DYSPNEA: ICD-10-CM

## 2023-10-11 PROCEDURE — 99215 OFFICE O/P EST HI 40 MIN: CPT

## 2023-10-11 RX ORDER — AMLODIPINE BESYLATE 5 MG/1
5 TABLET ORAL DAILY
Qty: 90 | Refills: 3 | Status: DISCONTINUED | COMMUNITY
Start: 2022-10-10 | End: 2023-10-11

## 2023-10-11 RX ORDER — DAPAGLIFLOZIN 5 MG/1
5 TABLET, FILM COATED ORAL DAILY
Qty: 90 | Refills: 3 | Status: DISCONTINUED | COMMUNITY
Start: 2022-10-03 | End: 2023-10-11

## 2023-10-11 RX ORDER — APIXABAN 5 MG/1
5 TABLET, FILM COATED ORAL
Qty: 180 | Refills: 2 | Status: DISCONTINUED | COMMUNITY
Start: 2023-01-11 | End: 2023-10-11

## 2023-10-18 ENCOUNTER — APPOINTMENT (OUTPATIENT)
Dept: CARDIOLOGY | Facility: CLINIC | Age: 83
End: 2023-10-18
Payer: MEDICARE

## 2023-10-18 VITALS
OXYGEN SATURATION: 99 % | SYSTOLIC BLOOD PRESSURE: 100 MMHG | HEIGHT: 71 IN | DIASTOLIC BLOOD PRESSURE: 68 MMHG | WEIGHT: 155 LBS | BODY MASS INDEX: 21.7 KG/M2 | HEART RATE: 110 BPM

## 2023-10-18 PROCEDURE — 99214 OFFICE O/P EST MOD 30 MIN: CPT

## 2023-10-18 RX ORDER — ASPIRIN 81 MG/1
81 TABLET ORAL
Qty: 90 | Refills: 1 | Status: DISCONTINUED | COMMUNITY
Start: 2023-10-11 | End: 2023-10-18

## 2024-03-14 ENCOUNTER — RX ONLY (RX ONLY)
Age: 84
End: 2024-03-14

## 2024-03-14 ENCOUNTER — OFFICE (OUTPATIENT)
Dept: URBAN - METROPOLITAN AREA CLINIC 8 | Facility: CLINIC | Age: 84
Setting detail: OPHTHALMOLOGY
End: 2024-03-14
Payer: MEDICARE

## 2024-03-14 DIAGNOSIS — H43.21: ICD-10-CM

## 2024-03-14 DIAGNOSIS — H35.373: ICD-10-CM

## 2024-03-14 DIAGNOSIS — H47.022: ICD-10-CM

## 2024-03-14 DIAGNOSIS — H50.10: ICD-10-CM

## 2024-03-14 DIAGNOSIS — H16.223: ICD-10-CM

## 2024-03-14 DIAGNOSIS — H26.491: ICD-10-CM

## 2024-03-14 DIAGNOSIS — Z96.1: ICD-10-CM

## 2024-03-14 PROCEDURE — 92134 CPTRZ OPH DX IMG PST SGM RTA: CPT | Performed by: OPHTHALMOLOGY

## 2024-03-14 PROCEDURE — 92014 COMPRE OPH EXAM EST PT 1/>: CPT | Performed by: OPHTHALMOLOGY

## 2024-03-14 ASSESSMENT — REFRACTION_MANIFEST
OD_ADD: +2.75
OS_CYLINDER: -1.25
OS_VA1: 20/25
OD_CYLINDER: -1.00
OD_VA1: 20/20-2
OS_ADD: +2.75
OD_SPHERE: -0.25
OS_VA2: 20/20(J1+)
OD_SPHERE: -0.25
OU_VA: 20/25-
OD_AXIS: 110
OS_VA2: 20/20(J1+)
OD_VA2: 20/20(J1+)
OD_CYLINDER: -1.00
OD_ADD: +2.75
OS_CYLINDER: -1.25
OD_AXIS: 110
OD_VA2: 20/20(J1+)
OS_SPHERE: +0.50
OS_ADD: +2.75
OS_AXIS: 90
OS_VA1: 20/25
OU_VA: 20/25-
OD_VA1: 20/20-2
OS_AXIS: 90
OS_SPHERE: +0.50

## 2024-03-14 ASSESSMENT — SPHEQUIV_DERIVED
OD_SPHEQUIV: -0.75
OD_SPHEQUIV: -0.75
OS_SPHEQUIV: -0.125
OS_SPHEQUIV: -0.125

## 2024-04-18 ENCOUNTER — APPOINTMENT (OUTPATIENT)
Dept: CARDIOLOGY | Facility: CLINIC | Age: 84
End: 2024-04-18
Payer: MEDICARE

## 2024-04-18 ENCOUNTER — NON-APPOINTMENT (OUTPATIENT)
Age: 84
End: 2024-04-18

## 2024-04-18 VITALS
WEIGHT: 158 LBS | HEART RATE: 80 BPM | SYSTOLIC BLOOD PRESSURE: 120 MMHG | BODY MASS INDEX: 22.12 KG/M2 | HEIGHT: 71 IN | DIASTOLIC BLOOD PRESSURE: 68 MMHG | OXYGEN SATURATION: 97 %

## 2024-04-18 DIAGNOSIS — Z86.39 PERSONAL HISTORY OF OTHER ENDOCRINE, NUTRITIONAL AND METABOLIC DISEASE: ICD-10-CM

## 2024-04-18 DIAGNOSIS — I48.0 PAROXYSMAL ATRIAL FIBRILLATION: ICD-10-CM

## 2024-04-18 DIAGNOSIS — I65.22 OCCLUSION AND STENOSIS OF LEFT CAROTID ARTERY: ICD-10-CM

## 2024-04-18 DIAGNOSIS — I25.10 ATHEROSCLEROTIC HEART DISEASE OF NATIVE CORONARY ARTERY W/OUT ANGINA PECTORIS: ICD-10-CM

## 2024-04-18 DIAGNOSIS — I10 ESSENTIAL (PRIMARY) HYPERTENSION: ICD-10-CM

## 2024-04-18 DIAGNOSIS — Z79.899 OTHER LONG TERM (CURRENT) DRUG THERAPY: ICD-10-CM

## 2024-04-18 DIAGNOSIS — Z79.01 LONG TERM (CURRENT) USE OF ANTICOAGULANTS: ICD-10-CM

## 2024-04-18 DIAGNOSIS — I35.1 NONRHEUMATIC AORTIC (VALVE) INSUFFICIENCY: ICD-10-CM

## 2024-04-18 PROCEDURE — G2211 COMPLEX E/M VISIT ADD ON: CPT

## 2024-04-18 PROCEDURE — 93880 EXTRACRANIAL BILAT STUDY: CPT

## 2024-04-18 PROCEDURE — 93000 ELECTROCARDIOGRAM COMPLETE: CPT

## 2024-04-18 PROCEDURE — 99215 OFFICE O/P EST HI 40 MIN: CPT

## 2024-04-18 RX ORDER — RIVAROXABAN 20 MG/1
20 TABLET, FILM COATED ORAL
Qty: 90 | Refills: 3 | Status: ACTIVE | COMMUNITY
Start: 2023-10-18 | End: 1900-01-01

## 2024-04-18 RX ORDER — ATORVASTATIN CALCIUM 40 MG/1
40 TABLET, FILM COATED ORAL
Qty: 90 | Refills: 3 | Status: ACTIVE | COMMUNITY
Start: 2023-03-30 | End: 1900-01-01

## 2024-05-23 ENCOUNTER — OFFICE (OUTPATIENT)
Dept: URBAN - METROPOLITAN AREA CLINIC 8 | Facility: CLINIC | Age: 84
Setting detail: OPHTHALMOLOGY
End: 2024-05-23
Payer: MEDICARE

## 2024-05-23 DIAGNOSIS — H47.022: ICD-10-CM

## 2024-05-23 PROCEDURE — 76514 ECHO EXAM OF EYE THICKNESS: CPT | Performed by: OPHTHALMOLOGY

## 2024-05-23 PROCEDURE — 92014 COMPRE OPH EXAM EST PT 1/>: CPT | Performed by: OPHTHALMOLOGY

## 2024-05-23 PROCEDURE — 92133 CPTRZD OPH DX IMG PST SGM ON: CPT | Performed by: OPHTHALMOLOGY

## 2024-05-23 ASSESSMENT — CONFRONTATIONAL VISUAL FIELD TEST (CVF)
OS_FINDINGS: FULL
OD_FINDINGS: FULL

## 2024-06-12 NOTE — HISTORY OF PRESENT ILLNESS
[FreeTextEntry1] : Historical Perspective: 84 year old male with PMHx of CAD, carotid artery disease, HLD, PAF, aortic valve insufficiency, DM presents to establish care with me. Patient has no chest pain, dyspnea or palpitations.  Current Health Status: Patient with no chest pain, SOB, or palpitations. No hospitalizations since seeing me last. Remains compliant with medications and reports no adverse effects.

## 2024-06-12 NOTE — CARDIOLOGY SUMMARY
[de-identified] : 4/18/2024, atrial fibrillation 1/11/2023, atrial fibrillation [de-identified] : 12/10/2021, Pharmacologic Nuclear Stress Testing: no ischemia or evidence of prior infarction noted on SPECT images. [de-identified] : 10/4/2023, LV EF 60%, LA is severely dilated, mild-moderate AI, mild TR. [de-identified] : 4/18/2024:  Left proximal ICA with >70% stenosis, EDV >100cm/sec 10/4/2023: Carotid Duplex: Left proximal ICA with >70% stenosis

## 2024-06-12 NOTE — ADDENDUM
[FreeTextEntry1] : Patient may proceed with carotid surgery from a cardiology standpoint. OK to hold NOAC 48 hours prior and resume post operatively once OK with surgeon.

## 2024-06-12 NOTE — PHYSICAL EXAM
[Normal] : moves all extremities, no focal deficits, normal speech [de-identified] : irregularly irregular [de-identified] : ambulates slowly with a cane

## 2024-06-12 NOTE — REVIEW OF SYSTEMS
[Feeling Fatigued] : feeling fatigued [Joint Stiffness] : joint stiffness [Negative] : Neurological [FreeTextEntry5] : see HPI

## 2024-06-12 NOTE — DISCUSSION/SUMMARY
[EKG obtained to assist in diagnosis and management of assessed problem(s)] : EKG obtained to assist in diagnosis and management of assessed problem(s) [FreeTextEntry1] : 1. Carotid Artery Stenosis: asymptomatic. Proximal ICA appeared to have worsened on today's carotid duplex with EDV  > 100cm/sec now. Will order CTA and refer to vascular surgery. Recommend continuing atorvastatin 40mg daily.  2. PAF: off AV prashanth blocking agent given >5 second pause on monitor in April 2022, while sleeping. Patient would like to be on Xarelto 20mg daily (high risk medication with no signs of toxicity).  3. HLD: goal LDL less than 70. Continue atorvastatin 40mg daily.  4. Aortic Valve Insufficiency: mild-moderate on echocardiogram from 10/3/2023. Periodic echo surveillance.   Follow up in 6 months.

## 2024-09-03 LAB — HBA1C MFR BLD HPLC: 5.7

## 2024-10-28 ENCOUNTER — APPOINTMENT (OUTPATIENT)
Dept: CARDIOLOGY | Facility: CLINIC | Age: 84
End: 2024-10-28

## 2025-02-27 ENCOUNTER — APPOINTMENT (OUTPATIENT)
Dept: CARDIOLOGY | Facility: CLINIC | Age: 85
End: 2025-02-27